# Patient Record
Sex: MALE | URBAN - METROPOLITAN AREA
[De-identification: names, ages, dates, MRNs, and addresses within clinical notes are randomized per-mention and may not be internally consistent; named-entity substitution may affect disease eponyms.]

---

## 2021-02-25 ENCOUNTER — OFFICE VISIT (OUTPATIENT)
Dept: URBAN - METROPOLITAN AREA CLINIC 63 | Facility: CLINIC | Age: 61
End: 2021-02-25

## 2022-07-09 ENCOUNTER — TELEPHONE ENCOUNTER (OUTPATIENT)
Dept: URBAN - METROPOLITAN AREA CLINIC 121 | Facility: CLINIC | Age: 62
End: 2022-07-09

## 2022-07-10 ENCOUNTER — TELEPHONE ENCOUNTER (OUTPATIENT)
Dept: URBAN - METROPOLITAN AREA CLINIC 121 | Facility: CLINIC | Age: 62
End: 2022-07-10

## 2022-11-08 ENCOUNTER — WEB ENCOUNTER (OUTPATIENT)
Dept: URBAN - METROPOLITAN AREA CLINIC 63 | Facility: CLINIC | Age: 62
End: 2022-11-08

## 2022-11-09 ENCOUNTER — TELEPHONE ENCOUNTER (OUTPATIENT)
Dept: URBAN - METROPOLITAN AREA CLINIC 63 | Facility: CLINIC | Age: 62
End: 2022-11-09

## 2022-11-10 ENCOUNTER — OFFICE VISIT (OUTPATIENT)
Dept: URBAN - METROPOLITAN AREA CLINIC 63 | Facility: CLINIC | Age: 62
End: 2022-11-10

## 2022-11-10 RX ORDER — ALLOPURINOL 100 MG/1
AS DIRECTED TABLET ORAL ONCE A DAY
Status: ACTIVE | COMMUNITY

## 2022-11-10 RX ORDER — PREDNISONE 10 MG/1
1 TABLET TABLET ORAL ONCE A DAY
Status: ACTIVE | COMMUNITY

## 2022-12-20 ENCOUNTER — OFFICE VISIT (OUTPATIENT)
Dept: URBAN - METROPOLITAN AREA TELEHEALTH 1 | Facility: TELEHEALTH | Age: 62
End: 2022-12-20
Payer: COMMERCIAL

## 2022-12-20 ENCOUNTER — TELEPHONE ENCOUNTER (OUTPATIENT)
Dept: URBAN - METROPOLITAN AREA CLINIC 63 | Facility: CLINIC | Age: 62
End: 2022-12-20

## 2022-12-20 ENCOUNTER — DASHBOARD ENCOUNTERS (OUTPATIENT)
Age: 62
End: 2022-12-20

## 2022-12-20 VITALS — HEIGHT: 72 IN | WEIGHT: 194 LBS | BODY MASS INDEX: 26.28 KG/M2

## 2022-12-20 DIAGNOSIS — Z01.818 ENCOUNTER FOR OTHER PREPROCEDURAL EXAMINATION: ICD-10-CM

## 2022-12-20 DIAGNOSIS — K21.9 GASTROESOPHAGEAL REFLUX DISEASE WITHOUT ESOPHAGITIS: ICD-10-CM

## 2022-12-20 DIAGNOSIS — Z12.11 COLON CANCER SCREENING: ICD-10-CM

## 2022-12-20 PROBLEM — 266435005: Status: ACTIVE | Noted: 2022-12-20

## 2022-12-20 PROCEDURE — 99202 OFFICE O/P NEW SF 15 MIN: CPT | Performed by: NURSE PRACTITIONER

## 2022-12-20 RX ORDER — ALLOPURINOL 100 MG/1
AS DIRECTED TABLET ORAL ONCE A DAY
Status: ACTIVE | COMMUNITY

## 2022-12-20 NOTE — HPI-TODAY'S VISIT:
He is seen for routine 10 year colon screening. He had colonoscopy 10 years ago which was normal.  He has no family history of colon cancer.  He does have a family history of Gastric cancer with his mother passing from the disease. He does have intermittent GERD but takes no medication unless having frequent symptoms.

## 2022-12-23 ENCOUNTER — WEB ENCOUNTER (OUTPATIENT)
Dept: URBAN - METROPOLITAN AREA CLINIC 6 | Facility: CLINIC | Age: 62
End: 2022-12-23

## 2023-02-08 ENCOUNTER — WEB ENCOUNTER (OUTPATIENT)
Dept: URBAN - METROPOLITAN AREA CLINIC 63 | Facility: CLINIC | Age: 63
End: 2023-02-08

## 2023-02-10 ENCOUNTER — OFFICE VISIT (OUTPATIENT)
Dept: URBAN - METROPOLITAN AREA SURGERY CENTER 4 | Facility: SURGERY CENTER | Age: 63
End: 2023-02-10
Payer: COMMERCIAL

## 2023-02-10 ENCOUNTER — CLAIMS CREATED FROM THE CLAIM WINDOW (OUTPATIENT)
Dept: URBAN - METROPOLITAN AREA CLINIC 4 | Facility: CLINIC | Age: 63
End: 2023-02-10
Payer: COMMERCIAL

## 2023-02-10 DIAGNOSIS — Z12.11 COLON CANCER SCREENING: ICD-10-CM

## 2023-02-10 DIAGNOSIS — K29.70 GASTRITIS WITHOUT BLEEDING: ICD-10-CM

## 2023-02-10 DIAGNOSIS — K44.9 HIATAL HERNIA: ICD-10-CM

## 2023-02-10 DIAGNOSIS — K21.00 GASTRO-ESOPHAGEAL REFLUX DISEASE WITH ESOPHAGITIS, WITHOUT BLEEDING: ICD-10-CM

## 2023-02-10 DIAGNOSIS — R12 HEARTBURN: ICD-10-CM

## 2023-02-10 DIAGNOSIS — K64.0 GRADE I INTERNAL HEMORRHOIDS: ICD-10-CM

## 2023-02-10 DIAGNOSIS — K31.89 OTHER DISEASES OF STOMACH AND DUODENUM: ICD-10-CM

## 2023-02-10 PROCEDURE — 43239 EGD BIOPSY SINGLE/MULTIPLE: CPT | Performed by: INTERNAL MEDICINE

## 2023-02-10 PROCEDURE — G0121 COLON CA SCRN NOT HI RSK IND: HCPCS | Performed by: INTERNAL MEDICINE

## 2023-02-10 PROCEDURE — 88305 TISSUE EXAM BY PATHOLOGIST: CPT | Performed by: PATHOLOGY

## 2023-02-10 RX ORDER — ALLOPURINOL 100 MG/1
AS DIRECTED TABLET ORAL ONCE A DAY
Status: ACTIVE | COMMUNITY

## 2023-02-13 PROBLEM — 305058001: Status: ACTIVE | Noted: 2022-12-20

## 2023-02-13 PROBLEM — 196731005 GASTRODUODENITIS: Status: ACTIVE | Noted: 2023-02-13

## 2023-03-10 ENCOUNTER — TELEPHONE ENCOUNTER (OUTPATIENT)
Dept: URBAN - METROPOLITAN AREA CLINIC 60 | Facility: CLINIC | Age: 63
End: 2023-03-10

## 2025-01-13 ENCOUNTER — OFFICE VISIT (OUTPATIENT)
Dept: URBAN - METROPOLITAN AREA CLINIC 63 | Facility: CLINIC | Age: 65
End: 2025-01-13
Payer: COMMERCIAL

## 2025-01-13 VITALS
WEIGHT: 199 LBS | HEART RATE: 71 BPM | BODY MASS INDEX: 26.95 KG/M2 | SYSTOLIC BLOOD PRESSURE: 123 MMHG | TEMPERATURE: 98.7 F | DIASTOLIC BLOOD PRESSURE: 65 MMHG | OXYGEN SATURATION: 89 % | HEIGHT: 72 IN

## 2025-01-13 DIAGNOSIS — Z12.11 COLON CANCER SCREENING: ICD-10-CM

## 2025-01-13 DIAGNOSIS — K21.9 GASTROESOPHAGEAL REFLUX DISEASE WITHOUT ESOPHAGITIS: ICD-10-CM

## 2025-01-13 PROCEDURE — 99214 OFFICE O/P EST MOD 30 MIN: CPT | Performed by: PHYSICIAN ASSISTANT

## 2025-01-13 RX ORDER — OMEPRAZOLE 40 MG/1
1 CAPSULE 30 MINUTES BEFORE MORNING MEAL CAPSULE, DELAYED RELEASE ORAL ONCE A DAY
Qty: 60 | Refills: 0 | OUTPATIENT
Start: 2025-01-13

## 2025-01-13 RX ORDER — ALLOPURINOL 300 MG/1
1 TABLET TABLET ORAL ONCE A DAY
COMMUNITY

## 2025-01-13 NOTE — HPI-TODAY'S VISIT:
64-year-old male with history of gout presents to the office for evaluation of GERD symptoms.  He presents to the office reporting with increased gerd symptoms in the form of nighttime acid regurgitation and heartburn. He has used lansoprazole in the past but stopped PPI after he lost some weight about 5-6 years ago. Currently he notices more GERD symptoms when he over eats or when he eats spicy foods. Currently using OTC antiacids as needed. He has no other GI complaints. No dysphagia, odynophagia, nausea, vomiting, abdominal pain, diarrhea, constipation, melena, hematochezia.   He is a former patient of Dr. Disla.  He was last seen in the office in December 2022.  He has a history of GERD and was having intermittent GERD symptoms and he was using OTC antacids on a demand basis.  EGD 2/10/2023 to assess heartburn:LA grade A esophagitis was found in the lower third of the esophagus.  Biopsy of the GE junction showed reflux type changes.  No evidence of Geller's esophagus.  1 cm hiatal hernia.  Mild inflammation was found in the gastric antrum.  Gastric antral biopsy showed no significant abnormality.  Normal duodenum.  Colonoscopy 2/10/2023 for CRC screening:Normal colonoscopy to the cecum with the exception of grade 1 internal hemorrhoids.  No specimens were collected.  Repeat colonoscopy was recommended in 10 years.  He has a family history of gastric cancer in his mother.

## 2025-02-05 ENCOUNTER — ERX REFILL RESPONSE (OUTPATIENT)
Dept: URBAN - METROPOLITAN AREA CLINIC 63 | Facility: CLINIC | Age: 65
End: 2025-02-05

## 2025-02-05 RX ORDER — OMEPRAZOLE 40 MG/1
1 CAPSULE 30 MINUTES BEFORE MORNING MEAL CAPSULE, DELAYED RELEASE ORAL ONCE A DAY
Qty: 60 | Refills: 0 | OUTPATIENT

## 2025-02-05 RX ORDER — OMEPRAZOLE 40 MG/1
1 CAPSULE CAPSULE, DELAYED RELEASE ORAL ONCE A DAY
Qty: 90 CAPSULE | Refills: 3 | OUTPATIENT

## 2025-03-12 ENCOUNTER — OFFICE VISIT (OUTPATIENT)
Dept: URBAN - METROPOLITAN AREA CLINIC 63 | Facility: CLINIC | Age: 65
End: 2025-03-12
Payer: COMMERCIAL

## 2025-03-12 VITALS
DIASTOLIC BLOOD PRESSURE: 87 MMHG | OXYGEN SATURATION: 97 % | WEIGHT: 200.6 LBS | HEIGHT: 72 IN | SYSTOLIC BLOOD PRESSURE: 134 MMHG | TEMPERATURE: 97 F | HEART RATE: 62 BPM | BODY MASS INDEX: 27.17 KG/M2

## 2025-03-12 DIAGNOSIS — K21.9 ACID REFLUX: ICD-10-CM

## 2025-03-12 DIAGNOSIS — Z12.11 COLON CANCER SCREENING: ICD-10-CM

## 2025-03-12 PROCEDURE — 99214 OFFICE O/P EST MOD 30 MIN: CPT | Performed by: PHYSICIAN ASSISTANT

## 2025-03-12 RX ORDER — OMEPRAZOLE 40 MG/1
1 CAPSULE CAPSULE, DELAYED RELEASE ORAL ONCE A DAY
Qty: 90 CAPSULE | Refills: 3 | Status: ACTIVE | COMMUNITY

## 2025-03-12 RX ORDER — ALLOPURINOL 300 MG/1
1 TABLET TABLET ORAL ONCE A DAY
Status: ACTIVE | COMMUNITY

## 2025-03-12 NOTE — HPI-TODAY'S VISIT:
64-year-old male with history of gout and GERD presents to the office for follow-up. He was last seen in the office in January 2025 reporting increased GERD symptoms in the form of nighttime acid regurgitation and heartburn.  He was noticing more GERD symptoms when he would overeat or when he would eat spicy foods.  He was using OTC antacids on a demand basis at the time.  He had no other GI complaints.  He was prescribed an 8-week course of omeprazole 40 mg once a day.  Antireflux diet and lifestyle were recommended.  He follows up today doing very well. States he has not had any GERD symptoms since his last office visit. He has been taking omeprazole daily. He states he believes his biggest contributor to GERD symptoms has been bed time snacking. He has no GI complaints today.   EGD 2/10/2023 to assess heartburn:LA grade A esophagitis was found in the lower third of the esophagus.  Biopsy of the GE junction showed reflux type changes.  No evidence of Geller's esophagus.  1 cm hiatal hernia.  Mild inflammation was found in the gastric antrum.  Gastric antral biopsy showed no significant abnormality.  Normal duodenum.  Colonoscopy 2/10/2023 for CRC screening:Normal colonoscopy to the cecum with the exception of grade 1 internal hemorrhoids.  No specimens were collected.  Repeat colonoscopy was recommended in 10 years.  He has a family history of gastric cancer in his mother.